# Patient Record
Sex: FEMALE | Race: WHITE | Employment: FULL TIME | URBAN - METROPOLITAN AREA
[De-identification: names, ages, dates, MRNs, and addresses within clinical notes are randomized per-mention and may not be internally consistent; named-entity substitution may affect disease eponyms.]

---

## 2017-08-11 ENCOUNTER — OFFICE VISIT (OUTPATIENT)
Dept: URGENT CARE | Facility: PHYSICIAN GROUP | Age: 67
End: 2017-08-11
Payer: COMMERCIAL

## 2017-08-11 VITALS
OXYGEN SATURATION: 95 % | DIASTOLIC BLOOD PRESSURE: 90 MMHG | TEMPERATURE: 99.6 F | WEIGHT: 106 LBS | HEIGHT: 62 IN | SYSTOLIC BLOOD PRESSURE: 170 MMHG | HEART RATE: 57 BPM | BODY MASS INDEX: 19.51 KG/M2

## 2017-08-11 DIAGNOSIS — S70.02XA CONTUSION OF LEFT HIP, INITIAL ENCOUNTER: ICD-10-CM

## 2017-08-11 DIAGNOSIS — T14.8XXA HEMATOMA: ICD-10-CM

## 2017-08-11 PROCEDURE — 99213 OFFICE O/P EST LOW 20 MIN: CPT | Performed by: FAMILY MEDICINE

## 2017-08-11 RX ORDER — ALPRAZOLAM 0.25 MG/1
0.25 TABLET ORAL NIGHTLY PRN
COMMUNITY

## 2017-08-11 RX ORDER — METOPROLOL TARTRATE 50 MG/1
50 TABLET, FILM COATED ORAL
Refills: 0 | COMMUNITY
Start: 2017-07-12

## 2017-08-11 ASSESSMENT — PAIN SCALES - GENERAL: PAINLEVEL: 4=SLIGHT-MODERATE PAIN

## 2017-08-11 ASSESSMENT — ENCOUNTER SYMPTOMS: MYALGIAS: 0

## 2017-08-11 NOTE — MR AVS SNAPSHOT
"Sukhjinderjudy Mosley   2017 11:00 AM   Office Visit   MRN: [id number]    Department:  [facility]   Dept Phone:  [phone]    Description:  Female : 1950   Provider:  Kiet Jackson M.D.           Reason for Visit     Hip Injury bruise is traveling down her leg, bump on hip, hit herself on bed post      Allergies as of 2017     No Known Allergies      Vital Signs     Blood Pressure Pulse Temperature Height Weight Body Mass Index    170/90 mmHg 57 37.6 °C (99.6 °F) 1.575 m (5' 2\") 48.081 kg (106 lb) 19.38 kg/m2    Oxygen Saturation Smoking Status                95% Current Every Day Smoker          Basic Information     Date Of Birth Sex Race Ethnicity Preferred Language    1950 Female White Unknown English      Health Maintenance        Date Due Completion Dates    IMM DTaP/Tdap/Td Vaccine (1 - Tdap) 1970 ---    PAP SMEAR 1972 ---    COLONOSCOPY 2001 ---    MAMMOGRAM 2010, 2009, 2009    IMM ZOSTER VACCINE 2011 ---    BONE DENSITY 2016    IMM PNEUMOCOCCAL 65+ (ADULT) LOW/MEDIUM RISK SERIES (1 of 2 - PCV13) 2016 ---    IMM INFLUENZA (1) 2017 ---            Current Immunizations     No immunizations on file.      Below and/or attached are the medications your provider expects you to take. Review all of your home medications and newly ordered medications with your provider and/or pharmacist. Follow medication instructions as directed by your provider and/or pharmacist. Please keep your medication list with you and share with your provider. Update the information when medications are discontinued, doses are changed, or new medications (including over-the-counter products) are added; and carry medication information at all times in the event of emergency situations     Allergies:  No Known Allergies          Medications  Valid as of: 2017 - 11:35 AM    Generic Name Brand Name Tablet Size Instructions for " use    Albuterol Sulfate (Aero Soln) albuterol 108 (90 BASE) MCG/ACT Inhale 1-2 Puffs by mouth every 6 hours as needed for Shortness of Breath.        ALPRAZolam (Tab) XANAX 0.25 MG Take 0.25 mg by mouth at bedtime as needed for Sleep.        Amoxicillin-Pot Clavulanate (Tab) AUGMENTIN 875-125 MG Take 1 Tab by mouth 2 times a day.        Aspirin (Tablet Delayed Response) ECOTRIN 81 MG Take 81 mg by mouth every day.        Hydrocodone-Acetaminophen (Tab) NORCO  MG Take 1 Tab by mouth every 6 hours as needed for Mild Pain.        Lisinopril   by Does not apply route.        Metoprolol Tartrate (Tab) LOPRESSOR 50 MG Take 50 mg by mouth.        Misc Natural Products   Take  by mouth.        .                 Medicines prescribed today were sent to:     [facility] #[id number] - [place] - [address] [place] [facility].    [address] [facility]. [place] [address]    Phone: [phone] Fax: [phone]    Open 24 Hours?: No      Medication refill instructions:       If your prescription bottle indicates you have medication refills left, it is not necessary to call your provider’s office. Please contact your pharmacy and they will refill your medication.    If your prescription bottle indicates you do not have any refills left, you may request refills at any time through one of the following ways: The online [facility] system (except Urgent Care), by calling your provider’s office, or by asking your pharmacy to contact your provider’s office with a refill request. Medication refills are processed only during regular business hours and may not be available until the next business day. Your provider may request additional information or to have a follow-up visit with you prior to refilling your medication.   *Please Note: Medication refills are assigned a new Rx number when refilled electronically. Your pharmacy may indicate that no refills were authorized even though a new prescription for the same medication is available at the pharmacy.  Please request the medicine by name with the pharmacy before contacting your provider for a refill.           MyChart Status: Patient Declined

## 2017-08-11 NOTE — PROGRESS NOTES
"Subjective:      Thelma Mosley is a 67 y.o. female who presents with Hip Injury            Hip Injury  This is a new problem. Episode onset: 5 days left hip bruise and swelling where she struck on furniture at home. Bruise is travelling down leg. Pain is mild. She is concerned about DVT. Entire leg is not swollen. No CP. No SOB. Daily baby ASA. No anticoagulation.  Pertinent negatives include no myalgias or rash.       Review of Systems   Musculoskeletal: Negative for myalgias and joint pain.   Skin: Negative for itching and rash.          Objective:     /90 mmHg  Pulse 57  Temp(Src) 37.6 °C (99.6 °F)  Ht 1.575 m (5' 2\")  Wt 48.081 kg (106 lb)  BMI 19.38 kg/m2  SpO2 95%     Physical Exam   Constitutional: She appears well-developed and well-nourished. No distress.   Musculoskeletal:        Legs:              Assessment/Plan:     1. Contusion of left hip, initial encounter     2. Hematoma       Differential diagnosis, natural history, supportive care, and indications for immediate follow-up discussed at length.   Warm compresses.     "

## 2021-03-03 DIAGNOSIS — Z23 NEED FOR VACCINATION: ICD-10-CM

## 2023-11-06 ENCOUNTER — HOSPITAL ENCOUNTER (EMERGENCY)
Facility: MEDICAL CENTER | Age: 73
End: 2023-11-06

## 2023-11-06 VITALS
WEIGHT: 95.46 LBS | OXYGEN SATURATION: 96 % | TEMPERATURE: 97.5 F | RESPIRATION RATE: 16 BRPM | BODY MASS INDEX: 17.46 KG/M2 | SYSTOLIC BLOOD PRESSURE: 204 MMHG | DIASTOLIC BLOOD PRESSURE: 102 MMHG | HEART RATE: 91 BPM

## 2023-11-06 PROCEDURE — 302449 STATCHG TRIAGE ONLY (STATISTIC)

## 2023-11-06 NOTE — ED TRIAGE NOTES
"Thelma Mosley  73 y.o. female  Chief Complaint   Patient presents with    Sent from Urgent Care     Pt sent here from [facility] for MRI of c-spine to rule out \"radiculopathy versus myelopathy versus musculoskeletal\"    Arm Pain     Reports 6/10 \"aching\" right upper arm pain since 9/4.       Pt amb to triage with steady gait for above complaint.   Pt is alert and oriented, speaking in full sentences, follows commands and responds appropriately to questions. Not in any apparent distress. Respirations are even and unlabored.  Pt placed in lobby. Pt educated on triage process. Pt encouraged to alert staff for any changes.    "

## 2023-11-06 NOTE — ED NOTES
Thelma Hubbardton expresses desire to leave. Risks in leaving ED before treatment given and diagnostics completed discussed with patient. EP completed AMA form and patient  signed form. Patient stated the facility that she was sent from had a opening for MRI so she is going back to the original facility.